# Patient Record
Sex: MALE | Race: WHITE | NOT HISPANIC OR LATINO | Employment: FULL TIME | URBAN - METROPOLITAN AREA
[De-identification: names, ages, dates, MRNs, and addresses within clinical notes are randomized per-mention and may not be internally consistent; named-entity substitution may affect disease eponyms.]

---

## 2024-05-06 ENCOUNTER — OFFICE VISIT (OUTPATIENT)
Dept: GASTROENTEROLOGY | Facility: CLINIC | Age: 49
End: 2024-05-06
Payer: COMMERCIAL

## 2024-05-06 VITALS
HEART RATE: 101 BPM | DIASTOLIC BLOOD PRESSURE: 96 MMHG | BODY MASS INDEX: 24.44 KG/M2 | TEMPERATURE: 97 F | HEIGHT: 69 IN | WEIGHT: 165 LBS | SYSTOLIC BLOOD PRESSURE: 144 MMHG | OXYGEN SATURATION: 98 %

## 2024-05-06 DIAGNOSIS — R19.5 LOOSE STOOLS: ICD-10-CM

## 2024-05-06 DIAGNOSIS — K50.013 CROHN'S DISEASE OF SMALL INTESTINE WITH FISTULA (HCC): Primary | ICD-10-CM

## 2024-05-06 PROCEDURE — 99204 OFFICE O/P NEW MOD 45 MIN: CPT | Performed by: DIETITIAN, REGISTERED

## 2024-05-06 RX ORDER — AMOXICILLIN AND CLAVULANATE POTASSIUM 875; 125 MG/1; MG/1
TABLET, FILM COATED ORAL
COMMUNITY
Start: 2024-04-19

## 2024-05-06 RX ORDER — QUERCETIN DIHYDRATE 100 %
POWDER (GRAM) MISCELLANEOUS
COMMUNITY

## 2024-05-06 NOTE — PROGRESS NOTES
[facility] - Outpatient Consultation New Patient  Efrain Rg 48 y.o. male MRN: [id number]  Encounter: [id number]          ASSESSMENT AND PLAN:    1.  Crohn's disease of small intestine with fistula  2.  Loose stools  Patient diagnosed w/ Crohn's disease at age 29.  Patient has been managing his Crohn's without the use of biologic medication for the last ~10 years.  He currently uses medical marijuana to help with his digestive health, which works well.  He has intermittent soft/loose stools from time to time, but overall feels quite well in regards to his Crohn's.  He typically has 2-3 BMs per day, sometimes formed, sometimes a bit loose.  He generally manages his intermittent diarrhea by avoiding dairy.  He denies any rectal bleeding, hematochezia, melena, nocturnal bowel movements, incontinence, rashes, joint pains.  He has occasional fecal urgency.  He has intermittent gas pains in the abdomen.  He has intermittent/occasional mouth sores.  Also has occasional heartburn depending on certain foods that he may eat.  Appetite is good and weight is stable.  No known family history of colon cancer.    Regarding his Crohn's history, he had a partial small bowel obstruction in 2013 with subsequent perforation, with a complicated 35-day hospitalization.  He had an ileocecectomy, but then needed further surgery with right-sided hemicolectomy.  Abdominal incision left open with eventual repair using skin graft.  Also had post-op fistulas, which eventually healed.  Patient has been on Remicade, azathioprine, and prednisone.  He would very much like to avoid any biologic medication for management of his Crohn's if at all possible, but he understands the possible need to get back on a medicine in the future depending on any active inflammation.    -Check CBC, CMP, CRP, vitamin D, iron panel, vitamin B12, fecal calprotectin, and C. difficile.  -Patient would prefer that we do not check  hepatitis panel or quant gold at this time.  -Schedule surveillance colonoscopy.  -Consider imaging such as MR enterography pending results of labs and colonoscopy.      Follow up in office after procedure.    ________________________________________________________    HPI:  Efrain Rg is a 48 y.o. male with history of hypertension, DVT, Crohn's disease who presents for evaluation of Crohn's disease.    Patient diagnosed w/ Crohn's disease at age 29.  Patient reports he has been managing his Crohn's disease without the use of biologic medication for the last ~10 years.  He currently uses medical marijuana to help with his digestive health, which works well.  He has intermittent soft/loose stools from time to time, but overall feels quite well in regards to his Crohn's.  He typically has 2-3 BMs per day, sometimes formed, sometimes a bit loose.  He generally manages his intermittent diarrhea by avoiding dairy.  He denies any rectal bleeding, hematochezia, melena, nocturnal bowel movements, incontinence, rashes, joint pains.  He has occasional fecal urgency.  He has intermittent gas pains in the abdomen.  He has intermittent/occasional mouth sores.  Also has occasional heartburn depending on certain foods that he may eat.  Appetite is good and weight is stable.  No known family history of colon cancer.    Regarding his Crohn's history, he had a partial small bowel obstruction in 2013 with subsequent perforation causing sepsis, C. difficile, MRSA, and a coma, with a 35-day hospitalization.  He had an ileocecectomy, but then needed further surgery with right-sided hemicolectomy.  Abdominal incision left open with eventual repair using skin graft.  Also had post-op fistulas, which eventually healed.  Patient has been on Remicade, azathioprine, and prednisone.  He would very much like to avoid any biologic medication for management of his Crohn's if at all possible, but he understands the possible need to get  back on a medicine in the future depending on any active inflammation.    MRI enterography in 2017 with mild inflammation in the ileum  Prior colonoscopy ~2017, no records available at this time    Reviewed prior records from [facility] in 2013.  Patient was diagnosed with Crohn's disease at age 29.  Crohn's disease has been noted to involve the distal small bowel with fistula.  Treated for several years with Remicade.  Hospitalized in 2013 with partial SBO and developed a perforation requiring several surgeries with washouts, midline abdomen left open requiring skin graft.  Patient had previously been on azathioprine but had joint pain, and prednisone.    Most recent labs available in chart are from 2021.      REVIEW OF SYSTEMS:  10 point ROS reviewed and negative, except as above    Historical Information   No past medical history on file.  No past surgical history on file.  Social History   Social History     Substance and Sexual Activity   Alcohol Use Not on file     Social History     Substance and Sexual Activity   Drug Use Not on file     Social History     Tobacco Use   Smoking Status Not on file   Smokeless Tobacco Not on file     No family history on file.    Meds/Allergies     No current outpatient medications on file.    Not on File        Objective   Wt Readings from Last 3 Encounters:   No data found for Wt     Temp Readings from Last 3 Encounters:   No data found for Temp     BP Readings from Last 3 Encounters:   No data found for BP     Pulse Readings from Last 3 Encounters:   No data found for Pulse        PHYSICAL EXAM:     Physical Exam  Vitals reviewed.   Constitutional:       General: He is not in acute distress.     Appearance: He is well-developed.   HENT:      Head: Normocephalic and atraumatic.   Eyes:      Conjunctiva/sclera: Conjunctivae normal.   Cardiovascular:      Rate and Rhythm: Normal rate and regular rhythm.      Heart sounds: No murmur heard.  Pulmonary:      Effort: Pulmonary effort  "is normal. No respiratory distress.      Breath sounds: Normal breath sounds.   Abdominal:      General: A surgical scar is present. Bowel sounds are normal. There is no distension.      Palpations: Abdomen is soft.      Tenderness: There is no abdominal tenderness.   Musculoskeletal:         General: No swelling.      Cervical back: Neck supple.   Skin:     General: Skin is warm and dry.   Neurological:      Mental Status: He is alert.   Psychiatric:         Mood and Affect: Mood normal.             Lab Results:   No visits with results within 1 Day(s) from this visit.   Latest known visit with results is:   No results found for any previous visit.       No results found for: \"WBC\", \"HGB\", \"HCT\", \"MCV\", \"PLT\"    Lab Results   Component Value Date    SODIUM 132 (L) 12/18/2021    K 4.3 12/18/2021     12/18/2021    CO2 24 12/18/2021    AGAP 5 12/18/2021    BUN 16 12/18/2021    CREATININE 0.89 12/18/2021    GLUC 103 (H) 12/18/2021    CALCIUM 8.8 12/18/2021    AST 20 12/18/2021    ALT 47 12/18/2021    ALKPHOS 86 12/18/2021    TP 7.8 12/18/2021    TBILI 0.5 12/18/2021    EGFR 63 08/10/2020         Radiology Results:   No results found.    "

## 2024-06-13 ENCOUNTER — TELEPHONE (OUTPATIENT)
Dept: GASTROENTEROLOGY | Facility: CLINIC | Age: 49
End: 2024-06-13

## 2024-06-13 DIAGNOSIS — E55.9 VITAMIN D INSUFFICIENCY: ICD-10-CM

## 2024-06-13 DIAGNOSIS — R79.89 LOW VITAMIN B12 LEVEL: Primary | ICD-10-CM

## 2024-06-13 DIAGNOSIS — E61.1 IRON DEFICIENCY: ICD-10-CM

## 2024-06-13 RX ORDER — FERROUS SULFATE 324(65)MG
324 TABLET, DELAYED RELEASE (ENTERIC COATED) ORAL EVERY OTHER DAY
Qty: 90 TABLET | Refills: 2 | Status: SHIPPED | OUTPATIENT
Start: 2024-06-13

## 2024-06-13 RX ORDER — LANOLIN ALCOHOL/MO/W.PET/CERES
1000 CREAM (GRAM) TOPICAL DAILY
Qty: 90 TABLET | Refills: 2 | Status: SHIPPED | OUTPATIENT
Start: 2024-06-13

## 2024-06-13 NOTE — TELEPHONE ENCOUNTER
----- Message from Yossi Paredes PA-C sent at 6/13/2024  7:51 AM EDT -----  Please notify patient that his labs show low levels of vitamin B12, vitamin D, and iron.  I sent prescriptions for supplements to his pharmacy.  Vitamin B12 and vitamin D will be once daily, and I recommend he take the iron every other day (this helps to optimize absorption while minimizing the risk of side effects including abdominal pain and constipation).  Thanks!!

## 2024-06-13 NOTE — TELEPHONE ENCOUNTER
Lorraine Mejia RN   Registered Nurse     Telephone Encounter      Signed     Encounter Date: 6/13/2024     Signed         Patient returned phone call. Aware of the his results and medications.  Patient verbalized understating.             Electronically signed by Lorraine Mejia RN at 6/13/2024 11:35 AM         Telephone on 6/13/2024          Note shared with patient

## 2024-06-13 NOTE — TELEPHONE ENCOUNTER
Patient returned phone call. Aware of the his results and medications.  Patient verbalized understating.

## 2024-06-13 NOTE — TELEPHONE ENCOUNTER
Patient returning phone call to review results. Phone call warm transferred to clinical team to review.

## 2024-09-23 ENCOUNTER — TELEPHONE (OUTPATIENT)
Age: 49
End: 2024-09-23

## 2024-09-24 ENCOUNTER — ANESTHESIA EVENT (OUTPATIENT)
Dept: ANESTHESIOLOGY | Facility: HOSPITAL | Age: 49
End: 2024-09-24

## 2024-09-24 ENCOUNTER — ANESTHESIA (OUTPATIENT)
Dept: ANESTHESIOLOGY | Facility: HOSPITAL | Age: 49
End: 2024-09-24

## 2024-10-07 ENCOUNTER — NURSE TRIAGE (OUTPATIENT)
Dept: OTHER | Facility: OTHER | Age: 49
End: 2024-10-07

## 2024-10-08 ENCOUNTER — ANESTHESIA (OUTPATIENT)
Dept: GASTROENTEROLOGY | Facility: MEDICAL CENTER | Age: 49
End: 2024-10-08
Payer: COMMERCIAL

## 2024-10-08 ENCOUNTER — ANESTHESIA EVENT (OUTPATIENT)
Dept: GASTROENTEROLOGY | Facility: MEDICAL CENTER | Age: 49
End: 2024-10-08
Payer: COMMERCIAL

## 2024-10-08 ENCOUNTER — HOSPITAL ENCOUNTER (OUTPATIENT)
Dept: GASTROENTEROLOGY | Facility: MEDICAL CENTER | Age: 49
Setting detail: OUTPATIENT SURGERY
Discharge: HOME | End: 2024-10-08
Payer: COMMERCIAL

## 2024-10-08 VITALS
WEIGHT: 165 LBS | RESPIRATION RATE: 18 BRPM | DIASTOLIC BLOOD PRESSURE: 77 MMHG | HEART RATE: 86 BPM | OXYGEN SATURATION: 99 % | HEIGHT: 69 IN | BODY MASS INDEX: 24.44 KG/M2 | SYSTOLIC BLOOD PRESSURE: 115 MMHG

## 2024-10-08 DIAGNOSIS — K50.013 CROHN'S DISEASE OF SMALL INTESTINE WITH FISTULA (HCC): ICD-10-CM

## 2024-10-08 PROCEDURE — 88342 IMHCHEM/IMCYTCHM 1ST ANTB: CPT | Performed by: PATHOLOGY

## 2024-10-08 PROCEDURE — 45380 COLONOSCOPY AND BIOPSY: CPT | Performed by: INTERNAL MEDICINE

## 2024-10-08 PROCEDURE — 88305 TISSUE EXAM BY PATHOLOGIST: CPT | Performed by: PATHOLOGY

## 2024-10-08 RX ORDER — SODIUM CHLORIDE 9 MG/ML
125 INJECTION, SOLUTION INTRAVENOUS CONTINUOUS
Status: DISCONTINUED | OUTPATIENT
Start: 2024-10-08 | End: 2024-10-12 | Stop reason: HOSPADM

## 2024-10-08 RX ORDER — PROPOFOL 10 MG/ML
INJECTION, EMULSION INTRAVENOUS CONTINUOUS PRN
Status: DISCONTINUED | OUTPATIENT
Start: 2024-10-08 | End: 2024-10-08

## 2024-10-08 RX ORDER — PROPOFOL 10 MG/ML
INJECTION, EMULSION INTRAVENOUS AS NEEDED
Status: DISCONTINUED | OUTPATIENT
Start: 2024-10-08 | End: 2024-10-08

## 2024-10-08 RX ORDER — SODIUM CHLORIDE 9 MG/ML
125 INJECTION, SOLUTION INTRAVENOUS CONTINUOUS
Status: CANCELLED | OUTPATIENT
Start: 2024-10-08

## 2024-10-08 RX ADMIN — PROPOFOL 40 MG: 10 INJECTION, EMULSION INTRAVENOUS at 10:10

## 2024-10-08 RX ADMIN — PROPOFOL 100 MG: 10 INJECTION, EMULSION INTRAVENOUS at 10:05

## 2024-10-08 RX ADMIN — PROPOFOL 150 MCG/KG/MIN: 10 INJECTION, EMULSION INTRAVENOUS at 10:10

## 2024-10-08 RX ADMIN — SODIUM CHLORIDE: 0.9 INJECTION, SOLUTION INTRAVENOUS at 09:40

## 2024-10-08 RX ADMIN — PROPOFOL 40 MG: 10 INJECTION, EMULSION INTRAVENOUS at 10:08

## 2024-10-08 RX ADMIN — PROPOFOL 40 MG: 10 INJECTION, EMULSION INTRAVENOUS at 10:06

## 2024-10-08 RX ADMIN — Medication 40 MG: at 10:08

## 2024-10-08 NOTE — ANESTHESIA POSTPROCEDURE EVALUATION
Post-Op Assessment Note    CV Status:  Stable  Pain Score: 0    Pain management: adequate       Mental Status:  Alert and awake   Hydration Status:  Euvolemic   PONV Controlled:  Controlled   Airway Patency:  Patent     Post Op Vitals Reviewed: Yes    No anethesia notable event occurred.    Staff: Anesthesiologist, CRNA           Last Filed PACU Vitals:  Vitals Value Taken Time   Temp     Pulse 79 10/08/24 1023   /68 10/08/24 1023   Resp 16 10/08/24 1023   SpO2 97 % 10/08/24 1023       Modified Goldy:  Activity: 2 (10/8/2024  9:45 AM)  Respiration: 2 (10/8/2024  9:45 AM)  Circulation: 2 (10/8/2024  9:45 AM)  Consciousness: 2 (10/8/2024  9:45 AM)  Oxygen Saturation: 2 (10/8/2024  9:45 AM)  Modified Goldy Score: 10 (10/8/2024  9:45 AM)

## 2024-10-08 NOTE — TELEPHONE ENCOUNTER
"Regarding: Needs arrival time for Colonoscopy tomorrow  ----- Message from Delicia KELSEY sent at 10/7/2024  9:13 PM EDT -----  \" I am having a Colonoscopy tomorrow and did not get an arrival time.\"    "

## 2024-10-08 NOTE — H&P
History and Physical -  [facility]  Efrain Rg 49 y.o. male MRN: [id number]                  HPI: Efrain Rg is a 49 y.o. year old male who presents for Crohn's      REVIEW OF SYSTEMS: Per the HPI, and otherwise unremarkable.    Historical Information   Past Medical History:   Diagnosis Date    Chronic kidney disease     Crohn's disease (HCC)      Past Surgical History:   Procedure Laterality Date    RIGHT COLON RESECTION       Social History   Social History     Substance and Sexual Activity   Alcohol Use None     Social History     Substance and Sexual Activity   Drug Use Yes    Types: Marijuana     Social History     Tobacco Use   Smoking Status Unknown   Smokeless Tobacco Not on file     No family history on file.    Meds/Allergies     Not in a hospital admission.    Allergies   Allergen Reactions    Azathioprine Other (See Comments) and Shortness Of Breath     Joint pain.    Other reaction(s): shortness of breath   joint pain/stiffness    Bee Venom Anaphylaxis    Other Hives     Had allergy injections in past for treatment-no reaction since       Objective     There were no vitals taken for this visit.      PHYSICAL EXAMINATION:    General Appearance:   Alert, cooperative, no distress   HEENT:  Normocephalic, atraumatic, anicteric. Neck supple, symmetrical, trachea midline.   Lungs:   Equal chest rise and unlabored breathing, normal effort, no coughing.   Cardiovascular:   No visualized JVD.   Abdomen:   No abdominal distension.   Skin:   No jaundice, rashes, or lesions.    Musculoskeletal:   Normal range of motion visualized.   Psych:  Normal affect and normal insight.   Neuro:  Alert and appropriate.           ASSESSMENT/PLAN:  This is a 49 y.o. year old male here for colonoscopy, and he is stable and optimized for his procedure.

## 2024-10-08 NOTE — ANESTHESIA POSTPROCEDURE EVALUATION
Post-Op Assessment Note    CV Status:  Stable    Pain management: adequate       Mental Status:  Alert and awake   Hydration Status:  Euvolemic   PONV Controlled:  Controlled   Airway Patency:  Patent     Post Op Vitals Reviewed: Yes    No anethesia notable event occurred.    Staff: Anesthesiologist           Last Filed PACU Vitals:  Vitals Value Taken Time   Temp     Pulse 86 10/08/24 1038   /77 10/08/24 1038   Resp 18 10/08/24 1038   SpO2 99 % 10/08/24 1038       Modified Goldy:  Activity: 2 (10/8/2024 10:42 AM)  Respiration: 2 (10/8/2024 10:42 AM)  Circulation: 2 (10/8/2024 10:42 AM)  Consciousness: 2 (10/8/2024 10:42 AM)  Oxygen Saturation: 2 (10/8/2024 10:42 AM)  Modified Goldy Score: 10 (10/8/2024 10:42 AM)

## 2024-10-08 NOTE — ANESTHESIA PREPROCEDURE EVALUATION
Procedure:  COLONOSCOPY    Relevant Problems   ANESTHESIA (within normal limits)      CARDIO (within normal limits)      ENDO (within normal limits)      GI/HEPATIC (within normal limits)      /RENAL (within normal limits)      GYN (within normal limits)      HEMATOLOGY (within normal limits)      MUSCULOSKELETAL (within normal limits)      NEURO/PSYCH (within normal limits)      PULMONARY (within normal limits)        Physical Exam    Airway       Dental       Cardiovascular  Cardiovascular exam normal    Pulmonary  Pulmonary exam normal     Other Findings        Anesthesia Plan  ASA Score- 1     Anesthesia Type- IV sedation with anesthesia with ASA Monitors.         Additional Monitors:     Airway Plan:            Plan Factors-Exercise tolerance (METS): >4 METS.    Chart reviewed.    Patient summary reviewed.    Patient is not a current smoker. Patient not instructed to abstain from smoking on day of procedure. Patient did not smoke on day of surgery.            Induction-     Postoperative Plan-         Informed Consent- Anesthetic plan and risks discussed with patient.  I personally reviewed this patient with the CRNA. Discussed and agreed on the Anesthesia Plan with the CRNA..

## 2024-10-14 PROCEDURE — 88305 TISSUE EXAM BY PATHOLOGIST: CPT | Performed by: PATHOLOGY

## 2024-10-14 PROCEDURE — 88342 IMHCHEM/IMCYTCHM 1ST ANTB: CPT | Performed by: PATHOLOGY

## 2024-10-15 ENCOUNTER — TELEPHONE (OUTPATIENT)
Age: 49
End: 2024-10-15

## 2024-10-15 NOTE — TELEPHONE ENCOUNTER
Spoke with patient's wife Jennifer and relayed normal results. Pt's wife verbalized understanding and will relay to pt.

## 2024-10-15 NOTE — TELEPHONE ENCOUNTER
----- Message from Mary Dias MD sent at 10/15/2024  3:35 PM EDT -----  Hi,    Can you please let him know his biopsies overall look good with no significant inflammation.     Please feel free to reach out with any questions, concerns or updates.     Sincerely,  Mary

## 2024-10-28 ENCOUNTER — OFFICE VISIT (OUTPATIENT)
Age: 49
End: 2024-10-28
Payer: COMMERCIAL

## 2024-10-28 VITALS
DIASTOLIC BLOOD PRESSURE: 70 MMHG | TEMPERATURE: 97.9 F | WEIGHT: 169 LBS | SYSTOLIC BLOOD PRESSURE: 122 MMHG | BODY MASS INDEX: 25.03 KG/M2 | HEART RATE: 89 BPM | OXYGEN SATURATION: 99 % | HEIGHT: 69 IN

## 2024-10-28 DIAGNOSIS — E55.9 VITAMIN D INSUFFICIENCY: ICD-10-CM

## 2024-10-28 DIAGNOSIS — E61.1 IRON DEFICIENCY: ICD-10-CM

## 2024-10-28 DIAGNOSIS — K50.013 CROHN'S DISEASE OF SMALL INTESTINE WITH FISTULA (HCC): Primary | ICD-10-CM

## 2024-10-28 DIAGNOSIS — R19.5 LOOSE STOOLS: ICD-10-CM

## 2024-10-28 DIAGNOSIS — R79.89 LOW VITAMIN B12 LEVEL: ICD-10-CM

## 2024-10-28 PROCEDURE — 99214 OFFICE O/P EST MOD 30 MIN: CPT | Performed by: INTERNAL MEDICINE

## 2024-10-28 NOTE — PROGRESS NOTES
Ambulatory Visit  Name: Efrain Rg      : 1975      MRN: [id number]  Encounter Provider: Mary Dias MD  Encounter Date: 10/28/2024   Encounter department: [facility]      Assessment & Plan  Crohn's disease of small intestine with fistula (HCC)    The patient is a 49-year-old gentleman with Crohn's disease diagnosed at age 29 who has not been on Biologics for the past 10+ years but who uses medical marijuana to help with his GI symptoms with intermittent loose stools who presents for follow-up.  He had a partial small bowel obstruction in  with subsequent perforation and complicated hospital course during which he had an ileocecectomy and then needed right sided hemicolectomy.  He has previously been on Remicade, azathioprine, prednisone. He has intermittent symptoms    Colonoscopy from 2024 with side-to-side ileocolonic anastomosis and ulcerated mucosa in the terminal ileum but normal-appearing colon.  Biopsies with some reactive changes but overall normal.    Echocardiogram from 2021 with EF of 60 to 65%.    CT from 2021 with no acute abnormality in the abdomen or pelvis.    Blood work from  notable for CBC with elevated platelets but normal white blood cell count, hemoglobin, MCV.  CMP with elevated glucose, low albumin, low sodium but otherwise normal.  Lipase normal.    Obtain enterography.  Obtain blood work  We discussed the risks and benefits of different treatment options including Skyrizi versus Humira versus Entyvio.  Low residue diet (avoid leafy green vegetables, raw fruits and vegetables especially with skin, nuts, seeds, corn/popcorn, high-fiber foods). Focus on texture modification  Drink at least 8 cups of water per day  Can use cholestyramine if needed but would try to minimize if possible.  Continue iron and vitamin B12    Orders:    CBC and differential; Future    Comprehensive metabolic  panel; Future    C-reactive protein; Future    Vitamin D 25 hydroxy; Future    Vitamin B12; Future    Quantiferon TB Gold Plus Assay; Future    Chronic Hepatitis Panel; Future    Iron Panel (Includes Ferritin, Iron Sat%, Iron, and TIBC); Future    Low vitamin B12 level    Orders:    CBC and differential; Future    Comprehensive metabolic panel; Future    C-reactive protein; Future    Vitamin D 25 hydroxy; Future    Vitamin B12; Future    Quantiferon TB Gold Plus Assay; Future    Chronic Hepatitis Panel; Future    Iron Panel (Includes Ferritin, Iron Sat%, Iron, and TIBC); Future    Vitamin D insufficiency    Orders:    CBC and differential; Future    Comprehensive metabolic panel; Future    C-reactive protein; Future    Vitamin D 25 hydroxy; Future    Vitamin B12; Future    Quantiferon TB Gold Plus Assay; Future    Chronic Hepatitis Panel; Future    Iron Panel (Includes Ferritin, Iron Sat%, Iron, and TIBC); Future    Iron deficiency    Orders:    CBC and differential; Future    Comprehensive metabolic panel; Future    C-reactive protein; Future    Vitamin D 25 hydroxy; Future    Vitamin B12; Future    Quantiferon TB Gold Plus Assay; Future    Chronic Hepatitis Panel; Future    Iron Panel (Includes Ferritin, Iron Sat%, Iron, and TIBC); Future    Loose stools    Orders:    CBC and differential; Future    Comprehensive metabolic panel; Future    C-reactive protein; Future    Vitamin D 25 hydroxy; Future    Vitamin B12; Future    Quantiferon TB Gold Plus Assay; Future    Chronic Hepatitis Panel; Future    Iron Panel (Includes Ferritin, Iron Sat%, Iron, and TIBC); Future      History of Present Illness       Efrain Rg is a 49 y.o. male who presents with Crohn's.     He has been having symptoms for the past year with a discomfort. It is where the surgery site.   Usually formed stools but sometimes runny. Can be 1-2 or 3-4 and then they are loose. He watches what he eats and exercises. He cut out caffeine and  nuts. He eats fruits and vegetables and it does not bother him. Stress might be a trigger. No blood or black stools.   Occasional heartburn. He was burping a lot more in the past year. It is better with turmeric.     History obtained from : patient    Review of systems:  10 point ROS reviewed and negative, except as above      Pertinent Medical History       Medical History Reviewed by provider this encounter:       Past Medical History   Past Medical History:   Diagnosis Date    Chronic kidney disease     Crohn's disease (HCC)      Past Surgical History:   Procedure Laterality Date    NASAL SEPTUM SURGERY      RIGHT COLON RESECTION       History reviewed. No pertinent family history.  Current Outpatient Medications on File Prior to Visit   Medication Sig Dispense Refill    Cholecalciferol (VITAMIN D3) 1,000 units tablet Take 1 tablet (1,000 Units total) by mouth daily 90 tablet 2    ferrous sulfate 324 (65 Fe) mg Take 1 tablet (324 mg total) by mouth every other day 90 tablet 2    NON FORMULARY Medical Marijuana      amoxicillin-clavulanate (AUGMENTIN) 875-125 mg per tablet take 1 tablet by mouth twice a day for 7 days (Patient not taking: Reported on 5/6/2024)      Quercetin Dihydrate POWD Use (Patient not taking: Reported on 5/6/2024)      vitamin B-12 (VITAMIN B-12) 1,000 mcg tablet Take 1 tablet (1,000 mcg total) by mouth daily (Patient not taking: Reported on 10/28/2024) 90 tablet 2     No current facility-administered medications on file prior to visit.     Allergies   Allergen Reactions    Azathioprine Other (See Comments) and Shortness Of Breath     Joint pain.    Other reaction(s): shortness of breath   joint pain/stiffness    Bee Venom Anaphylaxis    Other Hives     Had allergy injections in past for treatment-no reaction since      Current Outpatient Medications on File Prior to Visit   Medication Sig Dispense Refill    Cholecalciferol (VITAMIN D3) 1,000 units tablet Take 1 tablet (1,000 Units total) by  "mouth daily 90 tablet 2    ferrous sulfate 324 (65 Fe) mg Take 1 tablet (324 mg total) by mouth every other day 90 tablet 2    NON FORMULARY Medical Marijuana      amoxicillin-clavulanate (AUGMENTIN) 875-125 mg per tablet take 1 tablet by mouth twice a day for 7 days (Patient not taking: Reported on 5/6/2024)      Quercetin Dihydrate POWD Use (Patient not taking: Reported on 5/6/2024)      vitamin B-12 (VITAMIN B-12) 1,000 mcg tablet Take 1 tablet (1,000 mcg total) by mouth daily (Patient not taking: Reported on 10/28/2024) 90 tablet 2     No current facility-administered medications on file prior to visit.      Social History     Tobacco Use    Smoking status: Never    Smokeless tobacco: Never   Vaping Use    Vaping status: Never Used   Substance and Sexual Activity    Alcohol use: Not Currently    Drug use: Yes     Types: Marijuana    Sexual activity: Not on file         Objective     /70 (BP Location: Left arm, Patient Position: Sitting, Cuff Size: Adult)   Pulse 89   Temp 97.9 °F (36.6 °C) (Tympanic)   Ht 5' 8.5\" (1.74 m)   Wt 76.7 kg (169 lb)   SpO2 99%   BMI 25.32 kg/m²     PHYSICAL EXAMINATION:    General Appearance:   Alert, cooperative, no distress   HEENT:  Normocephalic, atraumatic, anicteric. Neck supple, symmetrical, trachea midline.   Lungs:   Equal chest rise and unlabored breathing, normal effort, no coughing.   Cardiovascular:   No visualized JVD.   Abdomen:   No abdominal distension.   Skin:   No jaundice, rashes, or lesions.    Musculoskeletal:   Normal range of motion visualized.   Psych:  Normal affect and normal insight.   Neuro:  Alert and appropriate.       Administrative Statements   I have spent a total time of 25 minutes in caring for this patient on the day of the visit/encounter including Diagnostic results, Prognosis, Risks and benefits of tx options, Instructions for management, Patient and family education, Importance of tx compliance, Risk factor reductions, Impressions, " Counseling / Coordination of care, and Documenting in the medical record.

## 2025-03-03 ENCOUNTER — OFFICE VISIT (OUTPATIENT)
Age: 50
End: 2025-03-03
Payer: COMMERCIAL

## 2025-03-03 VITALS
BODY MASS INDEX: 25.18 KG/M2 | OXYGEN SATURATION: 98 % | DIASTOLIC BLOOD PRESSURE: 82 MMHG | TEMPERATURE: 98.2 F | HEART RATE: 85 BPM | WEIGHT: 170 LBS | SYSTOLIC BLOOD PRESSURE: 136 MMHG | HEIGHT: 69 IN

## 2025-03-03 DIAGNOSIS — K50.013 CROHN'S DISEASE OF SMALL INTESTINE WITH FISTULA (HCC): Primary | ICD-10-CM

## 2025-03-03 PROCEDURE — 99214 OFFICE O/P EST MOD 30 MIN: CPT | Performed by: DIETITIAN, REGISTERED

## 2025-03-03 NOTE — PROGRESS NOTES
Name: Efrain Rg      : 1975      MRN: [id number]  Encounter Provider: Yossi Adame PA-C  Encounter Date: 3/3/2025   Encounter department: [facility]  :  Assessment & Plan  Crohn's disease of small intestine with fistula (HCC)  49 y.o. male with Crohn's disease diagnosed at age 29 with previous small bowel obstruction in 2013 with subsequent perforation and complicated hospital course during which he had an ileocecectomy and then needed right sided hemicolectomy, who has not been on Biologics for the past 10+ years (previously on infliximab, azathioprine, and prednisone) but who uses medical marijuana to help with his GI symptoms with intermittent loose stools who presents for follow-up.  Last office visit 10/2024.    Colonoscopy from 2024 with side-to-side ileocolonic anastomosis and ulcerated mucosa in the terminal ileum but normal-appearing colon. Biopsies with some reactive changes but overall normal.     Patient reports for the last several months he has been making an herbal tea daily (a mixture of lion's neto, reishi, and turkey tail mushroom powders, turmeric, zully, clove, nutmeg, cinnamon, and pepper).  Since starting this routine, he has had completely normal bowel function with 1-2 BMs/day and no abdominal pain, diarrhea, or blood in the stool.  He is able to tolerate dairy products again (which previously caused diarrhea).  He also denies extra-intestinal symptoms including skin rashes, joint pains, and eye pain or vision changes.  Of note, he has recently been found to have a 7 mm kidney stone, which he has now passed.  He denies any NSAID use other than recently due to kidney stone.    Orders:  •  CBC; Future  •  Comprehensive metabolic panel; Future  •  C-reactive protein; Future  •  Iron Panel (Includes Ferritin, Iron Sat%, Iron, and TIBC); Future  •  Vitamin B12; Future  •  Vitamin D 25 hydroxy; Future  -Obtain   enterography to evaluate for signs of active Crohn's disease.  -Check CBC, CMP, CRP, iron panel, vitamin B12, and vitamin D.  -Continue iron, vitamin D, and vitamin B12 supplements.  -Continue to avoid NSAIDs.  -OK to continue herbal/mushroom tea, but recommend adequate hydration due to recent kidney stone.  Follow up with urology pending stone analysis and for repeat CT scan.  -I recommend close follow-up and monitoring for Crohn's disease activity, given patient prefers to avoid medical therapy.  If any concern for disease activity, will discuss initiating medication with patient.  Options could include vedolizumab, risankizumab, upadacitinib.  -Follow up 6 months, unless needed sooner pending above work up and clinical symptoms.      History of Present Illness {?Quick Links Encounters * My Last Note * Last Note in Specialty * Snapshot * Since Last Visit * History :55404}  Efrain Rg is a 49 y.o. male with Crohn's disease diagnosed at age 29 with previous small bowel obstruction in 2013 with subsequent perforation and complicated hospital course during which he had an ileocecectomy and then needed right sided hemicolectomy, who has not been on Biologics for the past 10+ years (previously on infliximab, azathioprine, and prednisone) but who has previously used medical marijuana to help with his GI symptoms with intermittent loose stools who presents for follow-up.  Last office visit 10/2024.    Colonoscopy from October 2024 with side-to-side ileocolonic anastomosis and ulcerated mucosa in the terminal ileum but normal-appearing colon. Biopsies with some reactive changes but overall normal.     MR enterography ordered but not yet completed.    Patient reports for the last several months he has been making an herbal tea daily (a mixture of lion's neto, reishi, and turkey tail mushroom powders, turmeric, zully, clove, nutmeg, cinnamon, and pepper).  Since starting this routine, he has had completely normal  bowel function with 1-2 BMs/day and no abdominal pain, diarrhea, or blood in the stool.  He is able to tolerate dairy products again (which previously caused diarrhea).  He also denies extra-intestinal symptoms including skin rashes, joint pains, and eye pain or vision changes.  Of note, he has recently been found to have a 7 mm kidney stone, which he has now passed.  He denies any NSAID use other than recently due to kidney stone.      Regarding his Crohn's history, he had a partial small bowel obstruction in 2013 with subsequent perforation causing sepsis, C. difficile, MRSA, and a coma, with a 35-day hospitalization.  He had an ileocecectomy, but then needed further surgery with right-sided hemicolectomy.  Abdominal incision left open with eventual repair using skin graft.  Also had post-op fistulas, which eventually healed.  Patient has been on Remicade, azathioprine, and prednisone.  He would very much like to avoid any biologic medication for management of his Crohn's if at all possible, but he understands the possible need to get back on a medicine in the future depending on any active inflammation.      Reviewed prior records from [facility] in 2013.  Patient was diagnosed with Crohn's disease at age 29.  Crohn's disease has been noted to involve the distal small bowel with fistula.  Treated for several years with Remicade.  Hospitalized in 2013 with partial SBO and developed a perforation requiring several surgeries with washouts, midline abdomen left open requiring skin graft.  Patient had previously been on azathioprine but had joint pain, and prednisone.    HPI    Review of Systems A complete review of systems is negative other than that noted above in the HPI.      Current Outpatient Medications   Medication Sig Dispense Refill   • Cholecalciferol (VITAMIN D3) 1,000 units tablet Take 1 tablet (1,000 Units total) by mouth daily 90 tablet 2   • ferrous sulfate 324 (65 Fe) mg Take 1 tablet (324 mg total) by  "mouth every other day 90 tablet 2   • NON FORMULARY Medical Marijuana     • vitamin B-12 (VITAMIN B-12) 1,000 mcg tablet Take 1 tablet (1,000 mcg total) by mouth daily 90 tablet 2   • amoxicillin-clavulanate (AUGMENTIN) 875-125 mg per tablet take 1 tablet by mouth twice a day for 7 days (Patient not taking: Reported on 5/6/2024)     • Quercetin Dihydrate POWD Use (Patient not taking: Reported on 5/6/2024)       No current facility-administered medications for this visit.     Objective {?Quick Links Trend Vitals * Enter New Vitals * Results Review * Timeline (Adult) * Labs * Imaging * Cardiology * Procedures * Lung Cancer Screening * Surgical eConsent :98657}  /82   Pulse 85   Temp 98.2 °F (36.8 °C)   Ht 5' 9\" (1.753 m)   Wt 77.1 kg (170 lb)   SpO2 98%   BMI 25.10 kg/m²     Physical Exam  Vitals reviewed.   HENT:      Head: Normocephalic and atraumatic.   Eyes:      Conjunctiva/sclera: Conjunctivae normal.   Pulmonary:      Effort: Pulmonary effort is normal.   Skin:     Coloration: Skin is not jaundiced or pale.   Neurological:      General: No focal deficit present.   Psychiatric:         Mood and Affect: Mood normal.         Behavior: Behavior normal.        Lab Results: I personally reviewed relevant lab results.       Results for orders placed during the hospital encounter of 10/08/24    Colonoscopy    Impression  Side-to-side ileocolonic anastomosis in the ascending colon  Ulcerated mucosa in the terminal ileum; performed cold forceps biopsy  The entire colon appeared normal. Performed random biopsy using biopsy forceps.        RECOMMENDATION:  Repeat colonoscopy in 1 year, due: 10/8/2025  Inflammatory bowel disease    Await pathology results  Consider biologic therapy given significant ulcerations in the terminal ileum.  Also recommend obtaining MR enterography            Mary Dias MD        "

## 2025-07-21 ENCOUNTER — APPOINTMENT (OUTPATIENT)
Dept: LAB | Facility: MEDICAL CENTER | Age: 50
End: 2025-07-21
Payer: COMMERCIAL

## 2025-07-21 DIAGNOSIS — R79.89 LOW VITAMIN B12 LEVEL: ICD-10-CM

## 2025-07-21 DIAGNOSIS — E55.9 VITAMIN D INSUFFICIENCY: ICD-10-CM

## 2025-07-21 DIAGNOSIS — R19.5 LOOSE STOOLS: ICD-10-CM

## 2025-07-21 DIAGNOSIS — K50.013 CROHN'S DISEASE OF SMALL INTESTINE WITH FISTULA (HCC): ICD-10-CM

## 2025-07-21 DIAGNOSIS — E61.1 IRON DEFICIENCY: ICD-10-CM

## 2025-07-21 LAB
25(OH)D3 SERPL-MCNC: 37.4 NG/ML (ref 30–100)
ALBUMIN SERPL BCG-MCNC: 4.2 G/DL (ref 3.5–5)
ALP SERPL-CCNC: 73 U/L (ref 34–104)
ALT SERPL W P-5'-P-CCNC: 19 U/L (ref 7–52)
ANION GAP SERPL CALCULATED.3IONS-SCNC: 9 MMOL/L (ref 4–13)
AST SERPL W P-5'-P-CCNC: 19 U/L (ref 13–39)
BASOPHILS # BLD AUTO: 0.04 THOUSANDS/ÂΜL (ref 0–0.1)
BASOPHILS NFR BLD AUTO: 1 % (ref 0–1)
BILIRUB SERPL-MCNC: 1.29 MG/DL (ref 0.2–1)
BUN SERPL-MCNC: 14 MG/DL (ref 5–25)
CALCIUM SERPL-MCNC: 9.4 MG/DL (ref 8.4–10.2)
CHLORIDE SERPL-SCNC: 106 MMOL/L (ref 96–108)
CO2 SERPL-SCNC: 27 MMOL/L (ref 21–32)
CREAT SERPL-MCNC: 0.98 MG/DL (ref 0.6–1.3)
CRP SERPL QL: 35.9 MG/L
EOSINOPHIL # BLD AUTO: 0.06 THOUSAND/ÂΜL (ref 0–0.61)
EOSINOPHIL NFR BLD AUTO: 1 % (ref 0–6)
ERYTHROCYTE [DISTWIDTH] IN BLOOD BY AUTOMATED COUNT: 12.9 % (ref 11.6–15.1)
FERRITIN SERPL-MCNC: 58 NG/ML (ref 30–336)
GFR SERPL CREATININE-BSD FRML MDRD: 88 ML/MIN/1.73SQ M
GLUCOSE SERPL-MCNC: 96 MG/DL (ref 65–140)
HCT VFR BLD AUTO: 46 % (ref 36.5–49.3)
HGB BLD-MCNC: 15.3 G/DL (ref 12–17)
IMM GRANULOCYTES # BLD AUTO: 0.04 THOUSAND/UL (ref 0–0.2)
IMM GRANULOCYTES NFR BLD AUTO: 1 % (ref 0–2)
IRON SATN MFR SERPL: 13 % (ref 15–50)
IRON SERPL-MCNC: 52 UG/DL (ref 50–212)
LYMPHOCYTES # BLD AUTO: 1.79 THOUSANDS/ÂΜL (ref 0.6–4.47)
LYMPHOCYTES NFR BLD AUTO: 22 % (ref 14–44)
MCH RBC QN AUTO: 29.3 PG (ref 26.8–34.3)
MCHC RBC AUTO-ENTMCNC: 33.3 G/DL (ref 31.4–37.4)
MCV RBC AUTO: 88 FL (ref 82–98)
MONOCYTES # BLD AUTO: 0.93 THOUSAND/ÂΜL (ref 0.17–1.22)
MONOCYTES NFR BLD AUTO: 11 % (ref 4–12)
NEUTROPHILS # BLD AUTO: 5.33 THOUSANDS/ÂΜL (ref 1.85–7.62)
NEUTS SEG NFR BLD AUTO: 64 % (ref 43–75)
NRBC BLD AUTO-RTO: 0 /100 WBCS
PLATELET # BLD AUTO: 247 THOUSANDS/UL (ref 149–390)
PMV BLD AUTO: 10 FL (ref 8.9–12.7)
POTASSIUM SERPL-SCNC: 4.3 MMOL/L (ref 3.5–5.3)
PROT SERPL-MCNC: 7.7 G/DL (ref 6.4–8.4)
RBC # BLD AUTO: 5.22 MILLION/UL (ref 3.88–5.62)
SODIUM SERPL-SCNC: 142 MMOL/L (ref 135–147)
TIBC SERPL-MCNC: 414.4 UG/DL (ref 250–450)
TRANSFERRIN SERPL-MCNC: 296 MG/DL (ref 203–362)
UIBC SERPL-MCNC: 362 UG/DL (ref 155–355)
VIT B12 SERPL-MCNC: 381 PG/ML (ref 180–914)
WBC # BLD AUTO: 8.19 THOUSAND/UL (ref 4.31–10.16)

## 2025-07-21 PROCEDURE — 86705 HEP B CORE ANTIBODY IGM: CPT

## 2025-07-21 PROCEDURE — 80053 COMPREHEN METABOLIC PANEL: CPT

## 2025-07-21 PROCEDURE — 87340 HEPATITIS B SURFACE AG IA: CPT

## 2025-07-21 PROCEDURE — 82607 VITAMIN B-12: CPT

## 2025-07-21 PROCEDURE — 85025 COMPLETE CBC W/AUTO DIFF WBC: CPT

## 2025-07-21 PROCEDURE — 83540 ASSAY OF IRON: CPT

## 2025-07-21 PROCEDURE — 86803 HEPATITIS C AB TEST: CPT

## 2025-07-21 PROCEDURE — 82728 ASSAY OF FERRITIN: CPT

## 2025-07-21 PROCEDURE — 82306 VITAMIN D 25 HYDROXY: CPT

## 2025-07-21 PROCEDURE — 86480 TB TEST CELL IMMUN MEASURE: CPT

## 2025-07-21 PROCEDURE — 36415 COLL VENOUS BLD VENIPUNCTURE: CPT

## 2025-07-21 PROCEDURE — 86140 C-REACTIVE PROTEIN: CPT

## 2025-07-21 PROCEDURE — 83550 IRON BINDING TEST: CPT

## 2025-07-21 PROCEDURE — 86704 HEP B CORE ANTIBODY TOTAL: CPT

## 2025-07-22 LAB
HBV CORE AB SER QL: NORMAL
HBV CORE IGM SER QL: NORMAL
HBV SURFACE AG SER QL: NORMAL
HCV AB SER QL: NORMAL

## 2025-07-23 ENCOUNTER — RESULTS FOLLOW-UP (OUTPATIENT)
Age: 50
End: 2025-07-23

## 2025-07-23 DIAGNOSIS — R79.89 LOW VITAMIN B12 LEVEL: ICD-10-CM

## 2025-07-23 DIAGNOSIS — E61.1 IRON DEFICIENCY: ICD-10-CM

## 2025-07-23 DIAGNOSIS — K50.013 CROHN'S DISEASE OF SMALL INTESTINE WITH FISTULA (HCC): Primary | ICD-10-CM

## 2025-07-23 DIAGNOSIS — E55.9 VITAMIN D INSUFFICIENCY: ICD-10-CM

## 2025-07-23 LAB
GAMMA INTERFERON BACKGROUND BLD IA-ACNC: 0.05 IU/ML
M TB IFN-G BLD-IMP: NEGATIVE
M TB IFN-G CD4+ BCKGRND COR BLD-ACNC: 0 IU/ML
M TB IFN-G CD4+ BCKGRND COR BLD-ACNC: 0.01 IU/ML
MITOGEN IGNF BCKGRD COR BLD-ACNC: 4.55 IU/ML

## 2025-07-25 RX ORDER — LANOLIN ALCOHOL/MO/W.PET/CERES
1000 CREAM (GRAM) TOPICAL DAILY
Qty: 90 TABLET | Refills: 2 | Status: SHIPPED | OUTPATIENT
Start: 2025-07-25

## 2025-07-25 RX ORDER — FERROUS SULFATE 324(65)MG
324 TABLET, DELAYED RELEASE (ENTERIC COATED) ORAL EVERY OTHER DAY
Qty: 90 TABLET | Refills: 2 | Status: SHIPPED | OUTPATIENT
Start: 2025-07-25

## 2025-07-25 NOTE — TELEPHONE ENCOUNTER
----- Message from Yossi Adame PA-C sent at 7/25/2025  7:59 AM EDT -----    Due to new onset abdominal pain, I certainly recommend completing stool study to make sure Crohn's is not active/causing this abdominal pain.  I also recommend scheduling visit in the office to   discuss further, thank you!  ----- Message -----  From: Sue Golden MA  Sent: 7/24/2025   4:31 PM EDT  To: Yossi Adame PA-C    ----- Message from Sue Golden MA sent at 7/24/2025  4:31 PM EDT -----    Denzel baptiste,     Pt has been experiencing lower right side Abd pains and wanted to know if there is anything we can prescribe him when he does get the pains or if you have any advise or recommendation you can share   with him.     He has requested refills for his iron supplements and would like script order to be sent to the [facility] on [facility] in [place]. I have pended the order for reviewing.    Thank you!  ----- Message -----  From: Yossi Adame PA-C  Sent: 7/23/2025   1:54 PM EDT  To: [facility] [address] [facility]    Please call patient to go over lab results (he does not have MyChart).  Labs show elevated CRP (a marker of inflammation), as well as low iron levels.  I recommend continuing iron supplements every   other day.  If he is not currently taking those please let me know and I can send in refills.  I also recommend checking a stool study called fecal calprotectin, to make sure the inflammation is not   coming from the intestines.  I have placed this order if you could please explain to patient how to complete.  Thank you!!  ----- Message -----  From: Lab, Background User  Sent: 7/21/2025   8:15 PM EDT  To: Yossi Adame PA-C

## 2025-07-25 NOTE — TELEPHONE ENCOUNTER
LVM to schedule a sooner office visit with brian chambers, please do not cancel the appointment in 09/03/2025 with brian.

## 2025-07-28 NOTE — TELEPHONE ENCOUNTER
----- Message from Yossi Adame PA-C sent at 7/25/2025  7:59 AM EDT -----    Due to new onset abdominal pain, I certainly recommend completing stool study to make sure Crohn's is not active/causing this abdominal pain.  I also recommend scheduling visit in the office to   discuss further, thank you!  ----- Message -----  From: Sue Golden MA  Sent: 7/24/2025   4:31 PM EDT  To: Yossi Adame PA-C    ----- Message from Sue Golden MA sent at 7/24/2025  4:31 PM EDT -----    Denzel baptiste,     Pt has been experiencing lower right side Abd pains and wanted to know if there is anything we can prescribe him when he does get the pains or if you have any advise or recommendation you can share   with him.     He has requested refills for his iron supplements and would like script order to be sent to the [facility] on [facility] in [place]. I have pended the order for reviewing.    Thank you!  ----- Message -----  From: Yossi Adame PA-C  Sent: 7/23/2025   1:54 PM EDT  To: [facility] [address] [facility]    Please call patient to go over lab results (he does not have MyChart).  Labs show elevated CRP (a marker of inflammation), as well as low iron levels.  I recommend continuing iron supplements every   other day.  If he is not currently taking those please let me know and I can send in refills.  I also recommend checking a stool study called fecal calprotectin, to make sure the inflammation is not   coming from the intestines.  I have placed this order if you could please explain to patient how to complete.  Thank you!!  ----- Message -----  From: Lab, Background User  Sent: 7/21/2025   8:15 PM EDT  To: Yossi Adame PA-C

## 2025-07-28 NOTE — TELEPHONE ENCOUNTER
ANNELIESE to schedule sooner appt with brian, please if patient call dont cancel appt on 09/03/2025